# Patient Record
Sex: FEMALE | Race: WHITE | NOT HISPANIC OR LATINO
[De-identification: names, ages, dates, MRNs, and addresses within clinical notes are randomized per-mention and may not be internally consistent; named-entity substitution may affect disease eponyms.]

---

## 2019-11-22 PROBLEM — Z00.00 ENCOUNTER FOR PREVENTIVE HEALTH EXAMINATION: Status: ACTIVE | Noted: 2019-11-22

## 2019-11-25 ENCOUNTER — APPOINTMENT (OUTPATIENT)
Dept: UROGYNECOLOGY | Facility: CLINIC | Age: 59
End: 2019-11-25
Payer: COMMERCIAL

## 2019-11-25 VITALS
BODY MASS INDEX: 31.82 KG/M2 | SYSTOLIC BLOOD PRESSURE: 120 MMHG | WEIGHT: 198 LBS | HEIGHT: 66 IN | DIASTOLIC BLOOD PRESSURE: 70 MMHG

## 2019-11-25 LAB
BILIRUB UR QL STRIP: NORMAL
CLARITY UR: CLEAR
COLLECTION METHOD: NORMAL
GLUCOSE UR-MCNC: NORMAL
HCG UR QL: 0.2 EU/DL
HGB UR QL STRIP.AUTO: NORMAL
KETONES UR-MCNC: NORMAL
LEUKOCYTE ESTERASE UR QL STRIP: NORMAL
NITRITE UR QL STRIP: NORMAL
PH UR STRIP: 5.5
PROT UR STRIP-MCNC: NORMAL
SP GR UR STRIP: 1.01

## 2019-11-25 PROCEDURE — 81003 URINALYSIS AUTO W/O SCOPE: CPT | Mod: NC,QW

## 2019-11-25 PROCEDURE — 51701 INSERT BLADDER CATHETER: CPT

## 2019-11-25 PROCEDURE — 99244 OFF/OP CNSLTJ NEW/EST MOD 40: CPT | Mod: 25

## 2019-11-25 NOTE — HISTORY OF PRESENT ILLNESS
[FreeTextEntry1] : This is a 59-year-old woman with SLE and RA who describes having a baseline intermittent recurrent UTI every 3 months starting 2018 and  since January she essentially ishaving 'UTIs' which by her account are culture positive,treated with  antibiotics for about two thirds of each month. She hasvery brief periods of relief from symptoms which include dysuria and frequency. She has been on a number of regimens for RA and SLA  including steroids and a number of immunosuppressants. She is menopausal.\par \par Office asessment revealed normal external genitalia.There is significant atrophy of the apposing labia majora and labia minora (with irritation) and  there is moderate to severe atrophy of the vaginal introitus and urethral meatus. The vagina has normal depth and there's no evidence of any abnormal lesions. It is clear that she has rubbing and chafing sensation from the urethra and the labia she had immediate relief from the direct application of zinc oxide.\par \par The catheterized residual volume revealed 10 cc residual urine is negative for nitrites leukocytes and has trace microscopic blood. She is presently on nitrofurantoin.\par \par I do not have  her urine cultures by her account there almost always positive.\par \par Impression is that this patient has recurrent lower urinary tract symptomatology. Is unclear whether she has true UTI's  or whether she has false positives and atrophy symptoms which often are identical.  Based on her vaginal anatomy and labial anatomy voided urine specimens would have a very high false positive rate. I recommended only catheterized urine specimens and given her my partners office number closer to home to obtain cath urine specimens and she has symptoms. Given the remarkable improvement of symptoms of zinc oxide today I think she is voiding over inflamed atrophic tissues related to menopause and immunosuppressants.\par \par I have recommended vitamin C cranberry tablets hydration and methenamine twice daily as prevented its her UTIs. I would not recommend prophylactic antibiotics at this time. \par \par I Have prescribed a vaginal estrogen ring I think is from acid-base correction and the preventative actions of estrogen for UTIs and for vaginal comfort will give her great relief. The vaginal ring has a long least absorption of any of the local estrogens administer verify with Dr. Evelyn pope using this regimen\par \par I  recommended to stop using soap and scrubbing and excessive maneuvers to try clean this area and  have recommended a hand-held shower rinse only and the use of non-scented soaps (DOVE)\par \par She will using zinc or petroleum,  for symptomatic relief and petroleum if the white pasty zinc oxide is unfavorable for her.\par \par \par She'll see Dr. Franklin  in 2 weeks to establish herself the patient in the practice and to arrange so that she can access the practice with a doctor or nurse for catheterized specimens on short notice when necessary.  She should have no voided specimens.

## 2019-11-26 ENCOUNTER — RESULT REVIEW (OUTPATIENT)
Age: 59
End: 2019-11-26

## 2019-11-26 LAB
APPEARANCE: CLEAR
BACTERIA: NEGATIVE
BILIRUBIN URINE: NEGATIVE
BLOOD URINE: NEGATIVE
COLOR: NORMAL
GLUCOSE QUALITATIVE U: NEGATIVE
HYALINE CASTS: 0 /LPF
KETONES URINE: NEGATIVE
LEUKOCYTE ESTERASE URINE: NEGATIVE
MICROSCOPIC-UA: NORMAL
NITRITE URINE: NEGATIVE
PH URINE: 6
PROTEIN URINE: NEGATIVE
RED BLOOD CELLS URINE: 1 /HPF
SPECIFIC GRAVITY URINE: 1.01
SQUAMOUS EPITHELIAL CELLS: 0 /HPF
UROBILINOGEN URINE: NORMAL
WHITE BLOOD CELLS URINE: 0 /HPF

## 2019-11-27 ENCOUNTER — RESULT REVIEW (OUTPATIENT)
Age: 59
End: 2019-11-27

## 2019-11-27 LAB — BACTERIA UR CULT: NORMAL

## 2019-12-06 ENCOUNTER — APPOINTMENT (OUTPATIENT)
Dept: UROGYNECOLOGY | Facility: CLINIC | Age: 59
End: 2019-12-06
Payer: COMMERCIAL

## 2019-12-06 PROCEDURE — 99213 OFFICE O/P EST LOW 20 MIN: CPT

## 2019-12-06 NOTE — PHYSICAL EXAM
[No Acute Distress] : in no acute distress [Good Hygeine] : demonstrates good hygeine [Well developed] : well developed [Well Nourished] : ~L well nourished [Normal Memory] : ~T memory was ~L unimpaired [Soft, Nontender] : the abdomen was soft and nontender [Normal Mood/Affect] : mood and affect are normal [Oriented x3] : oriented to person, place, and time [Warm and Dry] : was warm and dry to touch [Normal Gait] : gait was normal [Vulvar Atrophy] : vulvar atrophy [Normal] : was normal [Atrophy] : atrophy

## 2019-12-09 NOTE — HISTORY OF PRESENT ILLNESS
[FreeTextEntry1] : Hx atrophic vaginitis and recurrent UTI.  Estring rx'd at last visit and pt presents today for insertion.  She has been using desitin daily with significant improvement in symptoms.  Denies any other changes since last visit.

## 2019-12-09 NOTE — DISCUSSION/SUMMARY
[FreeTextEntry1] : Estring inserted LOT [id number] exp 2/28[birth year]1921.  Pt has f/u in January with Dr. Green.  Multiple questions answered to her satisfaction and I spent 15 minutes with patient.  Instructed to call with any questions or concerns and she verbalizes understanding.\par

## 2020-01-23 NOTE — HISTORY OF PRESENT ILLNESS
[FreeTextEntry1] : Patient seen by Dr. Green within past few months, presenting to [facility] care. Reported hx of recurrent UTIs since 2018, do not have UCx reports avail to confirm. , atrophic vaginitis, estring placed in Dec 2019. Using zinc oxide to perineum, and was counseled on vit C, cranberry, and methenamine use. UCx specimens are to be cathed only.

## 2020-01-24 ENCOUNTER — APPOINTMENT (OUTPATIENT)
Dept: UROGYNECOLOGY | Facility: CLINIC | Age: 60
End: 2020-01-24

## 2020-01-24 ENCOUNTER — APPOINTMENT (OUTPATIENT)
Dept: UROGYNECOLOGY | Facility: CLINIC | Age: 60
End: 2020-01-24
Payer: COMMERCIAL

## 2020-01-24 DIAGNOSIS — N95.2 POSTMENOPAUSAL ATROPHIC VAGINITIS: ICD-10-CM

## 2020-01-24 PROCEDURE — 99214 OFFICE O/P EST MOD 30 MIN: CPT

## 2020-01-24 RX ORDER — METHENAMINE HIPPURATE 1 G/1
1 TABLET ORAL TWICE DAILY
Qty: 180 | Refills: 0 | Status: ACTIVE | COMMUNITY
Start: 2019-11-25 | End: 1900-01-01

## 2020-01-24 NOTE — HISTORY OF PRESENT ILLNESS
[FreeTextEntry1] : This is a patient it taken care of us previously for recurrent UTIs and atrophy who has had a vaginal estrogen ring in for several weeks. She has improvement in symptomatology. She does using externally, and if she does not use the zinc, and she sometimes has residual dysuria, symptoms, and it is her impression that most of her symptoms are related to atrophy.  She is thrilled with her improved symptoms.  \par \par She also came to us with undocumented multiple infections over the course of 6 months and is on preventative meds phentermine. Has also been without UTI symptoms.\par \par On examination, atrophy is improved, but it is clear. It is not getting the zinc placenta to the introitus as there is a clear demarcation at the point where there is no zinc and the more proximal region which is tender.  Education was given with regards to application of the cream. She'll continue with the vaginal estrogen ring and local zinc. I see her in March for the estrogen ring replacement and she remained symptom free. We will begin to taper methenamine\par \par Counseling was greater than 50 percent of encounter which included  26   minute face to face time for direct counseling.\par

## 2020-03-04 ENCOUNTER — APPOINTMENT (OUTPATIENT)
Dept: UROGYNECOLOGY | Facility: CLINIC | Age: 60
End: 2020-03-04

## 2020-03-13 ENCOUNTER — APPOINTMENT (OUTPATIENT)
Dept: UROGYNECOLOGY | Facility: CLINIC | Age: 60
End: 2020-03-13

## 2020-05-11 ENCOUNTER — APPOINTMENT (OUTPATIENT)
Dept: UROGYNECOLOGY | Facility: CLINIC | Age: 60
End: 2020-05-11
Payer: COMMERCIAL

## 2020-05-11 PROCEDURE — 99213 OFFICE O/P EST LOW 20 MIN: CPT | Mod: 25

## 2020-05-11 PROCEDURE — 51701 INSERT BLADDER CATHETER: CPT

## 2020-05-11 RX ORDER — CEPHALEXIN 500 MG/1
500 CAPSULE ORAL
Qty: 14 | Refills: 0 | Status: ACTIVE | COMMUNITY
Start: 2020-05-11 | End: 1900-01-01

## 2020-05-11 RX ORDER — NITROFURANTOIN (MONOHYDRATE/MACROCRYSTALS) 25; 75 MG/1; MG/1
100 CAPSULE ORAL
Qty: 10 | Refills: 0 | Status: DISCONTINUED | COMMUNITY
Start: 2020-04-13 | End: 2020-05-11

## 2020-05-11 NOTE — DISCUSSION/SUMMARY
[FreeTextEntry1] : Unable to perform in office dip due to recent pyridium use.  Will send CS and treat empirically with keflex.  Discussed that she does not have a culture confirmed UTI since starting estring and methenamine so it is likely helping her.  Instructed to call with any questions or concerns and she verbalizes understanding.\par

## 2020-05-11 NOTE — PROCEDURE
[Straight Catheterization] : insertion of a straight catheter [Patient] : the patient [Urinary Frequency] : urinary frequency [None] : there were no complications with the catheter insertion [___ Fr Straight Tip] : a [unfilled] in [place]amese straight tip catheter [Other: ___] : [unfilled] [Culture] : culture [No Complications] : no complications [Tolerated Well] : the patient tolerated the procedure well [Post procedure instructions and information given] : Post procedure instructions and information were given and reviewed with patient. [0] : 0

## 2020-05-11 NOTE — HISTORY OF PRESENT ILLNESS
[FreeTextEntry1] : Pt has hx recurrent UTI.  States she had about one per month last year until she was seen here.  She was started on estring 11/2019 but pt states "I don't think its working".  Last estring inserted 3/13/2020.  She was also started on methenamine which she is currently taking.  She called c/o dysuria and frequency on 4/13/20 and was given macrobid.  She did not want to come to office d/t concerns re: COVID-19.  Pt "thinks" symptoms improved after macrobid but returned 2 days ago.  Now she c/o frequency, dysuria and bladder spasms.  She took pyridium (last dose at 1pm) with some relief from dysuria.  Denies blood in the urine, back pain, fevers or chills.

## 2020-05-11 NOTE — PHYSICAL EXAM
[No Acute Distress] : in no acute distress [Well developed] : well developed [Well Nourished] : ~L well nourished [Good Hygeine] : demonstrates good hygeine [Oriented x3] : oriented to person, place, and time [Normal Mood/Affect] : mood and affect are normal [Normal Memory] : ~T memory was ~L unimpaired [Soft, Nontender] : the abdomen was soft and nontender [None] : no CVA tenderness [Normal Gait] : gait was normal [Warm and Dry] : was warm and dry to touch [Normal] : normal external genitalia

## 2020-05-15 ENCOUNTER — APPOINTMENT (OUTPATIENT)
Dept: OBGYN | Facility: CLINIC | Age: 60
End: 2020-05-15

## 2020-05-19 ENCOUNTER — TRANSCRIPTION ENCOUNTER (OUTPATIENT)
Age: 60
End: 2020-05-19

## 2020-05-19 ENCOUNTER — APPOINTMENT (OUTPATIENT)
Dept: UROGYNECOLOGY | Facility: CLINIC | Age: 60
End: 2020-05-19
Payer: COMMERCIAL

## 2020-05-19 DIAGNOSIS — Z87.440 PERSONAL HISTORY OF URINARY (TRACT) INFECTIONS: ICD-10-CM

## 2020-05-19 DIAGNOSIS — N95.2 POSTMENOPAUSAL ATROPHIC VAGINITIS: ICD-10-CM

## 2020-05-19 PROCEDURE — 99214 OFFICE O/P EST MOD 30 MIN: CPT | Mod: 95

## 2020-05-19 NOTE — HISTORY OF PRESENT ILLNESS
[Home] : at home, [unfilled] , at the time of the visit. [Patient] : the patient [Medical Office: ([facility])___] : at the medical office located in  [Self] : self [FreeTextEntry1] : 59 year old with frequency and complaints of recc uti however infection free since starting estring and methenamine.  Has negative culture May 11.\par \par Symptoms good today.  Started recent biologic actamra  for rheumatologic symptoms arthritis and lupus.   In the transition to this biologic felt symptoms, spasms, pain, frequency.  She received macrobid but cultures were negative.Similar to labor pain. \par \par She stopped the biologic and symptoms are approved.  \par \par It is my impression that she is having pelvic floor spasm likely and not bladder spasm as the dominant symptom os spasm and straining.  She will avoid bladder stimulants and start metaxalone for muscle relaxation.  I would consider vaginal diazepam as next step but suggest office exam when she feels an exacerbation is coming or is present.\par \par Renewal estring which she will change herself and rx for metaxalone\par \par \par Counseling was greater than 50 percent of encounter which included  29   minute face to face time for direct counseling.\par \par \par

## 2020-05-26 ENCOUNTER — APPOINTMENT (OUTPATIENT)
Dept: UROGYNECOLOGY | Facility: CLINIC | Age: 60
End: 2020-05-26
Payer: COMMERCIAL

## 2020-05-26 VITALS — TEMPERATURE: 98.9 F

## 2020-05-26 DIAGNOSIS — N95.2 POSTMENOPAUSAL ATROPHIC VAGINITIS: ICD-10-CM

## 2020-05-26 DIAGNOSIS — Z87.440 PERSONAL HISTORY OF URINARY (TRACT) INFECTIONS: ICD-10-CM

## 2020-05-26 PROCEDURE — 51701 INSERT BLADDER CATHETER: CPT

## 2020-05-26 PROCEDURE — 99214 OFFICE O/P EST MOD 30 MIN: CPT | Mod: 25

## 2020-05-26 RX ORDER — NITROFURANTOIN (MONOHYDRATE/MACROCRYSTALS) 25; 75 MG/1; MG/1
100 CAPSULE ORAL TWICE DAILY
Qty: 6 | Refills: 0 | Status: ACTIVE | COMMUNITY
Start: 2020-05-26 | End: 1900-01-01

## 2020-05-26 NOTE — HISTORY OF PRESENT ILLNESS
[FreeTextEntry1] : 59 year old with recc utis has had no infections since starting estring and methenamine.\par \par She has a telehealth visit in which pelvic spasm and burning were new symptoms and I felt likely was representing pelvic floor dysfunction. \par \par She has burrning and spasm for three days . Has been using cystex .\par \par On exam atrophy, and pelvic spasm are noted.  urine is dye stained so no office tests performed.  It was clear and non odorous\par \par Impression:\par \par PFD, atrophy, r/o uti\par \par Recommend:  off label counseling for valium vaginal suppository one half of ten mg suppository daily.  Suggest stopping trazadone at night and consider alternate sleep assist. Risks of multiple mood stabilizers reviewed.  Feel low dose absorption and severity of symptoms warrant use. \par \par Rx nitrofud bid 3 days and vaginal valium\par \par Counseling was greater than 50 percent of encounter which included  31  minute face to face time for direct counseling.\par

## 2020-06-23 ENCOUNTER — APPOINTMENT (OUTPATIENT)
Dept: UROGYNECOLOGY | Facility: CLINIC | Age: 60
End: 2020-06-23
Payer: COMMERCIAL

## 2020-06-23 DIAGNOSIS — M62.89 OTHER SPECIFIED DISORDERS OF MUSCLE: ICD-10-CM

## 2020-06-23 DIAGNOSIS — N95.2 POSTMENOPAUSAL ATROPHIC VAGINITIS: ICD-10-CM

## 2020-06-23 DIAGNOSIS — N30.90 CYSTITIS, UNSPECIFIED W/OUT HEMATURIA: ICD-10-CM

## 2020-06-23 DIAGNOSIS — Z87.440 PERSONAL HISTORY OF URINARY (TRACT) INFECTIONS: ICD-10-CM

## 2020-06-23 PROCEDURE — 99213 OFFICE O/P EST LOW 20 MIN: CPT | Mod: 95

## 2020-06-23 NOTE — HISTORY OF PRESENT ILLNESS
[Medical Office: ([facility])___] : at the medical office located in  [Home] : at home, [unfilled] , at the time of the visit. [Verbal consent obtained from patient] : the patient, [unfilled] [FreeTextEntry1] : 59 year old with Recurrent UTI's manages with methenamine and estring. \par \par She had recent symptoms and exam consistent with PFD and was started off label Valium vaginal suppositories and skelaxin\par \par She is on multiple mood stabilizers and recommended possible reduction in agents if acceptable with medical care team due to blader side effects. \par \par \par She is doing well.  Started new biologic med for rheumatoid arthritis and lupus.  She is only using skelaxin now as having nice result. Significant improvement. Started pelvic exercises after last appointment and feels also helping. I counseled regarding favoring pelvicrelaxation rather than strengthening.\par \par She will add heating pad to sacrum\par \par overall she is extremely happy with improvement and will continue regimen\par Counseling was greater than 50 percent of encounter which included  19   minute face to face time for direct counseling.\par

## 2021-05-03 RX ORDER — DIAZEPAM 100 %
POWDER (GRAM) MISCELLANEOUS
Qty: 30 | Refills: 0 | Status: ACTIVE | COMMUNITY
Start: 2020-05-26 | End: 1900-01-01

## 2021-06-15 ENCOUNTER — APPOINTMENT (OUTPATIENT)
Dept: UROGYNECOLOGY | Facility: CLINIC | Age: 61
End: 2021-06-15
Payer: COMMERCIAL

## 2021-06-15 VITALS
TEMPERATURE: 98.3 F | SYSTOLIC BLOOD PRESSURE: 133 MMHG | WEIGHT: 210 LBS | DIASTOLIC BLOOD PRESSURE: 80 MMHG | BODY MASS INDEX: 33.75 KG/M2 | HEIGHT: 66 IN | HEART RATE: 91 BPM

## 2021-06-15 LAB
BILIRUB UR QL STRIP: NORMAL
CLARITY UR: NORMAL
COLLECTION METHOD: NORMAL
GLUCOSE UR-MCNC: NORMAL
HCG UR QL: 0.2 EU/DL
HGB UR QL STRIP.AUTO: NORMAL
KETONES UR-MCNC: NORMAL
LEUKOCYTE ESTERASE UR QL STRIP: NORMAL
NITRITE UR QL STRIP: NORMAL
PH UR STRIP: 5.5
PROT UR STRIP-MCNC: 300
SP GR UR STRIP: 1.03

## 2021-06-15 PROCEDURE — 99214 OFFICE O/P EST MOD 30 MIN: CPT | Mod: 25

## 2021-06-15 PROCEDURE — 51701 INSERT BLADDER CATHETER: CPT

## 2021-06-15 PROCEDURE — 99072 ADDL SUPL MATRL&STAF TM PHE: CPT

## 2021-06-15 RX ORDER — METHENAMINE HIPPURATE 1 G/1
1 TABLET ORAL TWICE DAILY
Qty: 180 | Refills: 0 | Status: ACTIVE | COMMUNITY
Start: 2021-06-15 | End: 1900-01-01

## 2021-06-15 NOTE — HISTORY OF PRESENT ILLNESS
[FreeTextEntry1] : Testing chest this is a 60-year-old woman with pelvic floor dysfunction nor a significant muscle spasm and both pain and lower urinary tract symptomatology. He is on multiple mood stabilizing medications and we requiring if any to be reduced. Some of them have bladder side effects.\par \par The previous telescope visit. She was on Skelaxin only having stopped. The vaginal diazepam suppositories and she was doing quite well. She is also going to physical therapy for myofascial release a of june 2020 which was her last evaluation.\par \par She was not in regular followup with us because due to her immunosuppression . She was not going out during the pandemic.\par 2 history once for UTI and once had UTI symptoms, but the culture was negative.   Frequency, urgency, and dysuria or increased q. day, and this has provoked an increase in vaginal spasm\par \par On exam the vulva has significant atrophy. There is significant distance from the external vulva to the urethra, which is quite proximal.  It is clear that she is soiling of the vulvar tissues extensively during voiding..\par \par \par Due to symptoms of hesitancy and dysuria. Catheterization was performed. 10 cc residual volume any odorous cloudy and consistent with cystitis, which was confirmed on urine dip.  A culture was sent..  \par \par I will prescribe nitrofurantoin twice a day for 5 days and she will resume mass and a mean one tablet daily. She was using successfully for UTI suppression Prior to 2020\par \par Counseling was greater than 50 percent of encounter which included  26   minute face to face time for direct counseling.\par \par A chaperone was present for the entirety of the physical examination and for the exam room portion of patient interview\par

## 2021-06-17 LAB — BACTERIA UR CULT: NORMAL

## 2021-06-21 ENCOUNTER — NON-APPOINTMENT (OUTPATIENT)
Age: 61
End: 2021-06-21

## 2021-06-22 ENCOUNTER — NON-APPOINTMENT (OUTPATIENT)
Age: 61
End: 2021-06-22

## 2021-06-23 ENCOUNTER — APPOINTMENT (OUTPATIENT)
Dept: UROGYNECOLOGY | Facility: CLINIC | Age: 61
End: 2021-06-23
Payer: COMMERCIAL

## 2021-06-23 VITALS — TEMPERATURE: 97.6 F | DIASTOLIC BLOOD PRESSURE: 84 MMHG | SYSTOLIC BLOOD PRESSURE: 162 MMHG

## 2021-06-23 LAB
BILIRUB UR QL STRIP: NEGATIVE
CLARITY UR: NORMAL
COLLECTION METHOD: NORMAL
GLUCOSE UR-MCNC: NEGATIVE
HCG UR QL: 0.2 EU/DL
HGB UR QL STRIP.AUTO: NORMAL
KETONES UR-MCNC: NEGATIVE
LEUKOCYTE ESTERASE UR QL STRIP: NORMAL
NITRITE UR QL STRIP: NEGATIVE
PH UR STRIP: 5.5
PROT UR STRIP-MCNC: NEGATIVE
SP GR UR STRIP: >= 1.03

## 2021-06-23 PROCEDURE — 99072 ADDL SUPL MATRL&STAF TM PHE: CPT

## 2021-06-23 PROCEDURE — 99214 OFFICE O/P EST MOD 30 MIN: CPT

## 2021-06-23 PROCEDURE — 81003 URINALYSIS AUTO W/O SCOPE: CPT | Mod: QW

## 2021-06-23 RX ORDER — NITROFURANTOIN MACROCRYSTALS 100 MG/1
100 CAPSULE ORAL
Qty: 10 | Refills: 0 | Status: ACTIVE | COMMUNITY
Start: 2021-06-15 | End: 1900-01-01

## 2021-06-23 NOTE — PHYSICAL EXAM
[No Acute Distress] : in no acute distress [Well developed] : well developed [Well Nourished] : ~L well nourished [Good Hygeine] : demonstrates good hygeine [Oriented x3] : oriented to person, place, and time [Normal Mood/Affect] : mood and affect are normal [Anxiety] : patient is anxious [Warm and Dry] : was warm and dry to touch [Normal Gait] : gait was normal [Labia Majora] : were normal [Labia Minora] : were normal [Normal] : was normal

## 2021-06-23 NOTE — REASON FOR VISIT
[Follow-Up Visit_____] : a follow-up visit for [unfilled] [Painful Urination] : painful urination [________] : [unfilled]

## 2021-06-23 NOTE — DISCUSSION/SUMMARY
[FreeTextEntry1] : #Estring:\par -Both estrings removed, reinserted one of the two.  The patient knows it is impossible to know if the newest one was reinserted.  She understands that the old one may be in situ.  \par \par #Suspected UTI:\par -Udip: trace blood, small leukocytes\par -F/U formal UA and UCx\par -Sent another 5 day course of Macrobid to the pharmacy\par -Patient in on immunosuppression, may want to consider longer course of treatment for future UTI infections\par \par #Dispo:\par -Patient is scheduled to RTO on 7/22\par \par All questions answered to the patient's satisfaction.  Patient expresses understanding.

## 2021-06-23 NOTE — PROCEDURE
[Straight Catheterization] : insertion of a straight catheter [Urinary Tract Infection] : a urinary tract infection [Patient] : the patient [Allergies Reviewed] : Allergies reviewed [___ Fr Straight Tip] : a [unfilled] in [place]ese straight tip catheter [None] : there were no complications with the catheter insertion [Cloudy] : cloudy [Culture] : culture [Urinalysis] : urinalysis [No Complications] : no complications [Tolerated Well] : the patient tolerated the procedure well [0] : 0 [Follow Up] : follow up [FreeTextEntry2] : Both Estrings removed, replaced with what is believe to be the newest one

## 2021-06-23 NOTE — REVIEW OF SYSTEMS
[All Other ROS] : all other reviewed systems are negative [FreeTextEntry1] : dysuria, urinary frequency

## 2021-06-25 ENCOUNTER — NON-APPOINTMENT (OUTPATIENT)
Age: 61
End: 2021-06-25

## 2021-06-25 LAB — BACTERIA UR CULT: NORMAL

## 2021-06-28 LAB
APPEARANCE: CLEAR
BACTERIA: NEGATIVE
BILIRUBIN URINE: NEGATIVE
BLOOD URINE: NORMAL
COLOR: YELLOW
GLUCOSE QUALITATIVE U: NEGATIVE
HYALINE CASTS: 2 /LPF
KETONES URINE: NEGATIVE
LEUKOCYTE ESTERASE URINE: ABNORMAL
MICROSCOPIC-UA: NORMAL
NITRITE URINE: NEGATIVE
PH URINE: 6
PROTEIN URINE: NORMAL
RED BLOOD CELLS URINE: 4 /HPF
SPECIFIC GRAVITY URINE: 1.03
SQUAMOUS EPITHELIAL CELLS: 6 /HPF
UROBILINOGEN URINE: NORMAL
WHITE BLOOD CELLS URINE: 89 /HPF

## 2021-07-22 ENCOUNTER — APPOINTMENT (OUTPATIENT)
Dept: UROGYNECOLOGY | Facility: CLINIC | Age: 61
End: 2021-07-22
Payer: COMMERCIAL

## 2021-07-22 ENCOUNTER — NON-APPOINTMENT (OUTPATIENT)
Age: 61
End: 2021-07-22

## 2021-07-22 VITALS — TEMPERATURE: 96.9 F | SYSTOLIC BLOOD PRESSURE: 152 MMHG | DIASTOLIC BLOOD PRESSURE: 82 MMHG

## 2021-07-22 DIAGNOSIS — M79.18 MYALGIA, OTHER SITE: ICD-10-CM

## 2021-07-22 PROCEDURE — 99072 ADDL SUPL MATRL&STAF TM PHE: CPT

## 2021-07-22 PROCEDURE — 99213 OFFICE O/P EST LOW 20 MIN: CPT

## 2021-07-22 RX ORDER — ESTRADIOL 2 MG/1
2 RING VAGINAL
Qty: 1 | Refills: 3 | Status: ACTIVE | COMMUNITY
Start: 2019-11-25 | End: 1900-01-01

## 2021-07-22 NOTE — HISTORY OF PRESENT ILLNESS
[FreeTextEntry1] : This is a 60-year-old woman with frequency, urgency, and pelvic floor dysfunction. He uses Estring for hormone replacement vaginal diazepam every other day nitrofurantoin prophylaxis. She's had 2 recent cultures for increasing lower urinary tract symptomatology, and they were both negative. She's R. rheumatologist thought this might be a flap from recurrent arthritis or lupus instigating any interstitial cystitis type response and she had excellent response to gabapentin 300 mg once daily. She continues to take metaxalone half a tablet each morning, and methenamine prophylaxis\par \par \par On this regimen. Her symptoms are quite nicely and check it is of interest. The gabapentin had significant improvement and off flare suggesting a connection between her rheumatoid and inflammatory state and her bladder, inflammatory state.   She's a renewal on the vaginal ring adjacent to pharmacy, otherwise, she'll continue the regimen as listed above.\par \par She still has a best much for often that she would like. She is number of side effects from other medications that prohibit anticholinergics.   At the next, visit she's able to taper down some of these other medications. We will consider myrbetriq history she is able to have  blood pressure checks weekly for a month or so \par \par Counseling was greater than 50 percent of encounter which included  21   minute face to face time for direct counseling.\par \par A chaperone was present for the entirety of the physical examination and for the exam room portion of patient interview\par

## 2021-07-29 ENCOUNTER — NON-APPOINTMENT (OUTPATIENT)
Age: 61
End: 2021-07-29

## 2021-09-23 ENCOUNTER — NON-APPOINTMENT (OUTPATIENT)
Age: 61
End: 2021-09-23

## 2021-09-23 ENCOUNTER — APPOINTMENT (OUTPATIENT)
Dept: UROGYNECOLOGY | Facility: CLINIC | Age: 61
End: 2021-09-23
Payer: COMMERCIAL

## 2021-09-23 VITALS — SYSTOLIC BLOOD PRESSURE: 150 MMHG | DIASTOLIC BLOOD PRESSURE: 80 MMHG | TEMPERATURE: 97.2 F

## 2021-09-23 DIAGNOSIS — R39.11 HESITANCY OF MICTURITION: ICD-10-CM

## 2021-09-23 DIAGNOSIS — N32.81 OVERACTIVE BLADDER: ICD-10-CM

## 2021-09-23 DIAGNOSIS — M62.838 OTHER MUSCLE SPASM: ICD-10-CM

## 2021-09-23 LAB
BILIRUB UR QL STRIP: NORMAL
CLARITY UR: NORMAL
COLLECTION METHOD: NORMAL
GLUCOSE UR-MCNC: NORMAL
HCG UR QL: 0.2 EU/DL
HGB UR QL STRIP.AUTO: NORMAL
KETONES UR-MCNC: NORMAL
LEUKOCYTE ESTERASE UR QL STRIP: NORMAL
NITRITE UR QL STRIP: NORMAL
PH UR STRIP: 6
PROT UR STRIP-MCNC: NORMAL
SP GR UR STRIP: 1.02

## 2021-09-23 PROCEDURE — 51701 INSERT BLADDER CATHETER: CPT

## 2021-09-23 PROCEDURE — 99213 OFFICE O/P EST LOW 20 MIN: CPT | Mod: 25

## 2021-09-23 RX ORDER — MIRABEGRON 50 MG/1
50 TABLET, FILM COATED, EXTENDED RELEASE ORAL
Qty: 90 | Refills: 1 | Status: ACTIVE | COMMUNITY
Start: 2021-09-23 | End: 1900-01-01

## 2021-09-23 RX ORDER — NITROFURANTOIN MACROCRYSTALS 100 MG/1
100 CAPSULE ORAL
Qty: 10 | Refills: 0 | Status: ACTIVE | COMMUNITY
Start: 2021-09-23 | End: 1900-01-01

## 2021-09-23 NOTE — HISTORY OF PRESENT ILLNESS
[FreeTextEntry1] : This is a  60-year-old woman whi  is treated with the Estring vaginal ring for atrophy. Cultures in the May and  June, time-frame are negative. She occasionally has flares offrequency and urgency, which had been high recently, but in the last 2 days during particular relaxation exercises. Her frequency and urgency was improved.\par \par A third, probable pelvic floor spasms pinching and burning in the vaginal region largely improved and almost resolved with metaxalone.\par \par \par The frequency and urgency remainder most significant symptoms. The examination is unchanged. Very atrophic changes somewhat improved from previous. She was catheterized to rule infection and did indeed have trace blood large leukocytes. She is immunosuppressed she may have expressing the usual UTI symptoms, but with increased frequency of uterine nitrofurantoin twice a day for 5 days.  \par \par The Estring was removed and administering replaced, which will continue every 3 months. Risks, benefits, and adverse reactions have been reviewed\par \par \par She does not have high blood pressure, and we discussed possible interactions other medications. She is taking and the need for blood pressure monitoring and after counseling of her some benefit she would like to start  Myrbetriq 50 mg daily.    She understands guidelines for blood pressure testing, and when to stop the medication. I will follow up in 3 months, unless her symptoms do not improve or if they are exacerbated\par \par New Prescription: Myrbetriq\par \par New Prescription for active UTI:  Nitrofurantoin 100 mg bid for 5 days\par \par Counseling was greater than 50 percent of encounter which included  33   minute face to face time for direct counseling.\par \par A chaperone was present for the entirety of the physical examination and for the exam room portion of patient interview\par \par \par \par

## 2021-09-27 ENCOUNTER — NON-APPOINTMENT (OUTPATIENT)
Age: 61
End: 2021-09-27

## 2021-09-27 LAB
APPEARANCE: ABNORMAL
BACTERIA: ABNORMAL
BILIRUBIN URINE: NEGATIVE
BLOOD URINE: NORMAL
COLOR: YELLOW
GLUCOSE QUALITATIVE U: NEGATIVE
HYALINE CASTS: 1 /LPF
KETONES URINE: NEGATIVE
LEUKOCYTE ESTERASE URINE: ABNORMAL
MICROSCOPIC-UA: NORMAL
NITRITE URINE: POSITIVE
PH URINE: 6
PROTEIN URINE: NORMAL
RED BLOOD CELLS URINE: 6 /HPF
SPECIFIC GRAVITY URINE: 1.02
SQUAMOUS EPITHELIAL CELLS: 2 /HPF
UROBILINOGEN URINE: NORMAL
WHITE BLOOD CELLS URINE: 419 /HPF

## 2021-12-07 ENCOUNTER — APPOINTMENT (OUTPATIENT)
Dept: UROGYNECOLOGY | Facility: CLINIC | Age: 61
End: 2021-12-07
Payer: COMMERCIAL

## 2021-12-07 VITALS — TEMPERATURE: 97.3 F

## 2021-12-07 DIAGNOSIS — N39.0 URINARY TRACT INFECTION, SITE NOT SPECIFIED: ICD-10-CM

## 2021-12-07 DIAGNOSIS — R30.0 DYSURIA: ICD-10-CM

## 2021-12-07 DIAGNOSIS — Z87.898 PERSONAL HISTORY OF OTHER SPECIFIED CONDITIONS: ICD-10-CM

## 2021-12-07 DIAGNOSIS — R39.15 URGENCY OF URINATION: ICD-10-CM

## 2021-12-07 DIAGNOSIS — R35.0 FREQUENCY OF MICTURITION: ICD-10-CM

## 2021-12-07 PROCEDURE — 99213 OFFICE O/P EST LOW 20 MIN: CPT

## 2021-12-07 RX ORDER — PHENAZOPYRIDINE 200 MG/1
200 TABLET, FILM COATED ORAL 3 TIMES DAILY
Qty: 15 | Refills: 0 | Status: ACTIVE | COMMUNITY
Start: 2021-06-30 | End: 1900-01-01

## 2021-12-07 NOTE — HISTORY OF PRESENT ILLNESS
[FreeTextEntry1] : patient with hx of TAYLER and chronic IC presents to office with complaints of urinary urgency, frequency and dysuria x 4 days. She states on Saturday she did a telehealth visit and was started on Macrobid x 5 days. States she has not had her urine tested, and she has been taking the macrobid as RX with no relief. Denies fever, chills, back pain or blood in urine. States she took Pyridium with minimal relief. She currently has an estring in place.

## 2021-12-07 NOTE — DISCUSSION/SUMMARY
[FreeTextEntry1] : #Suspected UTI:\par - Unable to dip urine in office due to taking Pyridium, urine is very orange in color. Formal Urine CS sent to lab. \par - Complete course of Macrobid as RX\par - Advised to take Pyridium 200mg 1 tab q8h as needed, educated to take with food\par - Patient in on immunosuppression, may want to consider longer course of treatment for future UTI infections\par - Educated patient to follow IC diet\par \par Will RTO in 1 month for estring or sooner if needed.  All questions answered to the patient's satisfaction.  Patient expresses understanding.

## 2021-12-07 NOTE — PROCEDURE
[Straight Catheterization] : insertion of a straight catheter [Urinary Tract Infection] : a urinary tract infection [Patient] : the patient [Allergies Reviewed] : Allergies reviewed [___ Fr Straight Tip] : a [unfilled] in [place] straight tip catheter [None] : there were no complications with the catheter insertion [Culture] : culture [No Complications] : no complications [Tolerated Well] : the patient tolerated the procedure well [0] : 0 [Other: ___] : [unfilled] [FreeTextEntry1] : Urethra cleared, catheter passed. No CVAT

## 2021-12-07 NOTE — PHYSICAL EXAM
[No Acute Distress] : in no acute distress [Well developed] : well developed [Well Nourished] : ~L well nourished [Good Hygeine] : demonstrates good hygeine [Oriented x3] : oriented to person, place, and time [Normal Mood/Affect] : mood and affect are normal [Anxiety] : patient is anxious [Warm and Dry] : was warm and dry to touch [Normal Gait] : gait was normal [Labia Majora] : were normal [Labia Minora] : were normal [Normal] : was normal [None] : no CVA tenderness [Normal Memory] : ~T memory was ~L impaired [Tenderness] : ~T no ~M abdominal tenderness observed [Distended] : not distended

## 2021-12-07 NOTE — REVIEW OF SYSTEMS
[All Other ROS] : all other reviewed systems are negative [FreeTextEntry1] : Dyusira, urinary frequency, urinary urgency

## 2021-12-08 RX ORDER — TOCILIZUMAB 180 MG/ML
162 INJECTION, SOLUTION SUBCUTANEOUS
Refills: 0 | Status: ACTIVE | COMMUNITY

## 2021-12-23 ENCOUNTER — APPOINTMENT (OUTPATIENT)
Dept: UROGYNECOLOGY | Facility: CLINIC | Age: 61
End: 2021-12-23

## 2021-12-23 RX ORDER — METAXALONE 800 MG/1
800 TABLET ORAL
Qty: 90 | Refills: 0 | Status: ACTIVE | COMMUNITY
Start: 2020-05-19 | End: 1900-01-01

## 2022-01-06 ENCOUNTER — APPOINTMENT (OUTPATIENT)
Dept: UROGYNECOLOGY | Facility: CLINIC | Age: 62
End: 2022-01-06

## 2022-12-09 ENCOUNTER — APPOINTMENT (OUTPATIENT)
Dept: FAMILY MEDICINE | Facility: CLINIC | Age: 62
End: 2022-12-09

## 2022-12-09 VITALS
HEART RATE: 88 BPM | SYSTOLIC BLOOD PRESSURE: 136 MMHG | DIASTOLIC BLOOD PRESSURE: 78 MMHG | OXYGEN SATURATION: 98 % | TEMPERATURE: 99 F | HEIGHT: 66 IN

## 2022-12-09 DIAGNOSIS — M32.9 SYSTEMIC LUPUS ERYTHEMATOSUS, UNSPECIFIED: ICD-10-CM

## 2022-12-09 DIAGNOSIS — Z78.9 OTHER SPECIFIED HEALTH STATUS: ICD-10-CM

## 2022-12-09 DIAGNOSIS — F17.200 NICOTINE DEPENDENCE, UNSPECIFIED, UNCOMPLICATED: ICD-10-CM

## 2022-12-09 DIAGNOSIS — Z80.3 FAMILY HISTORY OF MALIGNANT NEOPLASM OF BREAST: ICD-10-CM

## 2022-12-09 DIAGNOSIS — Z82.49 FAMILY HISTORY OF ISCHEMIC HEART DISEASE AND OTHER DISEASES OF THE CIRCULATORY SYSTEM: ICD-10-CM

## 2022-12-09 DIAGNOSIS — Z87.39 PERSONAL HISTORY OF OTHER DISEASES OF THE MUSCULOSKELETAL SYSTEM AND CONNECTIVE TISSUE: ICD-10-CM

## 2022-12-09 DIAGNOSIS — N30.10 INTERSTITIAL CYSTITIS (CHRONIC) W/OUT HEMATURIA: ICD-10-CM

## 2022-12-09 PROCEDURE — 99204 OFFICE O/P NEW MOD 45 MIN: CPT

## 2022-12-09 RX ORDER — IBUPROFEN 800 MG/1
800 TABLET, FILM COATED ORAL
Qty: 270 | Refills: 0 | Status: ACTIVE | COMMUNITY
Start: 2022-10-31

## 2022-12-09 RX ORDER — CIPROFLOXACIN HYDROCHLORIDE 500 MG/1
500 TABLET, FILM COATED ORAL
Qty: 20 | Refills: 0 | Status: DISCONTINUED | COMMUNITY
Start: 2022-11-03

## 2022-12-09 RX ORDER — AZITHROMYCIN 250 MG/1
250 TABLET, FILM COATED ORAL
Qty: 6 | Refills: 0 | Status: DISCONTINUED | COMMUNITY
Start: 2022-07-07

## 2022-12-09 RX ORDER — LEFLUNOMIDE 20 MG/1
20 TABLET, FILM COATED ORAL
Qty: 90 | Refills: 0 | Status: DISCONTINUED | COMMUNITY
Start: 2022-06-15

## 2022-12-09 RX ORDER — GABAPENTIN 300 MG/1
300 CAPSULE ORAL
Qty: 120 | Refills: 0 | Status: ACTIVE | COMMUNITY
Start: 2022-11-07

## 2022-12-09 RX ORDER — MIRABEGRON 50 MG/1
50 TABLET, FILM COATED, EXTENDED RELEASE ORAL
Qty: 90 | Refills: 0 | Status: ACTIVE | COMMUNITY
Start: 2021-09-23

## 2022-12-09 RX ORDER — MIRABEGRON 25 MG/1
25 TABLET, FILM COATED, EXTENDED RELEASE ORAL
Qty: 90 | Refills: 0 | Status: DISCONTINUED | COMMUNITY
Start: 2022-07-18

## 2022-12-09 RX ORDER — ATORVASTATIN CALCIUM 20 MG/1
20 TABLET, FILM COATED ORAL
Qty: 90 | Refills: 0 | Status: DISCONTINUED | COMMUNITY
Start: 2022-06-03

## 2022-12-09 RX ORDER — PHENAZOPYRIDINE HYDROCHLORIDE 200 MG/1
200 TABLET ORAL
Qty: 6 | Refills: 0 | Status: DISCONTINUED | COMMUNITY
Start: 2022-11-03

## 2022-12-09 RX ORDER — NITROFURANTOIN (MONOHYDRATE/MACROCRYSTALS) 25; 75 MG/1; MG/1
100 CAPSULE ORAL
Qty: 14 | Refills: 0 | Status: DISCONTINUED | COMMUNITY
Start: 2022-11-07

## 2022-12-09 RX ORDER — AMOXICILLIN AND CLAVULANATE POTASSIUM 875; 125 MG/1; MG/1
875-125 TABLET, COATED ORAL
Qty: 14 | Refills: 0 | Status: DISCONTINUED | COMMUNITY
Start: 2022-10-03

## 2022-12-09 RX ORDER — UPADACITINIB 45 MG/1
TABLET, EXTENDED RELEASE ORAL
Refills: 0 | Status: ACTIVE | COMMUNITY

## 2022-12-09 RX ORDER — HYDROXYZINE HYDROCHLORIDE 25 MG/1
25 TABLET ORAL
Qty: 30 | Refills: 0 | Status: ACTIVE | COMMUNITY
Start: 2022-12-01

## 2022-12-09 RX ORDER — ESTRADIOL 0.1 MG/G
0.1 CREAM VAGINAL
Refills: 0 | Status: ACTIVE | COMMUNITY

## 2022-12-09 RX ORDER — TRAZODONE HYDROCHLORIDE 50 MG/1
50 TABLET ORAL
Qty: 90 | Refills: 0 | Status: ACTIVE | COMMUNITY
Start: 2022-09-23

## 2022-12-09 RX ORDER — DULOXETINE HYDROCHLORIDE 30 MG/1
30 CAPSULE, DELAYED RELEASE PELLETS ORAL
Qty: 270 | Refills: 0 | Status: ACTIVE | COMMUNITY
Start: 2022-09-23

## 2022-12-09 RX ORDER — LEFLUNOMIDE 10 MG/1
10 TABLET, FILM COATED ORAL
Qty: 90 | Refills: 0 | Status: DISCONTINUED | COMMUNITY
Start: 2022-09-16

## 2022-12-09 RX ORDER — METHENAMINE HIPPURATE 1 G/1
1 TABLET ORAL
Qty: 60 | Refills: 0 | Status: DISCONTINUED | COMMUNITY
Start: 2022-10-28

## 2022-12-09 RX ORDER — DOXYCYCLINE HYCLATE 100 MG/1
100 CAPSULE ORAL
Qty: 20 | Refills: 0 | Status: DISCONTINUED | COMMUNITY
Start: 2022-09-04

## 2022-12-09 RX ORDER — PREDNISONE 5 MG/1
5 TABLET ORAL
Qty: 60 | Refills: 0 | Status: DISCONTINUED | COMMUNITY
Start: 2022-09-23

## 2022-12-09 NOTE — HISTORY OF PRESENT ILLNESS
[Cold Symptoms] : cold symptoms [Severe] : severe [___ Days ago] : [unfilled] days ago [Constant] : constant [Congestion] : congestion [Cough] : cough [Sore Throat] : sore throat [Wheezing] : wheezing [Chills] : chills [Anorexia] : no anorexia [Shortness Of Breath] : no shortness of breath [Earache] : no earache [Fatigue] : fatigue [Headache] : headache [Fever] : fever [Worsening] : worsening

## 2022-12-26 ENCOUNTER — TRANSCRIPTION ENCOUNTER (OUTPATIENT)
Age: 62
End: 2022-12-26

## 2023-04-11 ENCOUNTER — APPOINTMENT (OUTPATIENT)
Dept: FAMILY MEDICINE | Facility: CLINIC | Age: 63
End: 2023-04-11
Payer: COMMERCIAL

## 2023-04-11 VITALS
SYSTOLIC BLOOD PRESSURE: 122 MMHG | OXYGEN SATURATION: 98 % | DIASTOLIC BLOOD PRESSURE: 80 MMHG | WEIGHT: 194 LBS | TEMPERATURE: 99.5 F | HEIGHT: 66 IN | BODY MASS INDEX: 31.18 KG/M2 | HEART RATE: 108 BPM

## 2023-04-11 DIAGNOSIS — Z13.220 ENCOUNTER FOR SCREENING FOR LIPOID DISORDERS: ICD-10-CM

## 2023-04-11 DIAGNOSIS — Z13.1 ENCOUNTER FOR SCREENING FOR DIABETES MELLITUS: ICD-10-CM

## 2023-04-11 DIAGNOSIS — Z00.00 ENCOUNTER FOR GENERAL ADULT MEDICAL EXAMINATION W/OUT ABNORMAL FINDINGS: ICD-10-CM

## 2023-04-11 DIAGNOSIS — J32.9 CHRONIC SINUSITIS, UNSPECIFIED: ICD-10-CM

## 2023-04-11 DIAGNOSIS — Z13.29 ENCOUNTER FOR SCREENING FOR OTHER SUSPECTED ENDOCRINE DISORDER: ICD-10-CM

## 2023-04-11 PROCEDURE — 99214 OFFICE O/P EST MOD 30 MIN: CPT

## 2023-04-11 RX ORDER — AMOXICILLIN AND CLAVULANATE POTASSIUM 875; 125 MG/1; MG/1
875-125 TABLET, COATED ORAL
Qty: 20 | Refills: 0 | Status: ACTIVE | COMMUNITY
Start: 2022-12-09 | End: 1900-01-01

## 2023-04-11 NOTE — REVIEW OF SYSTEMS
[Fever] : fever [Fatigue] : fatigue [Nasal Discharge] : nasal discharge [Postnasal Drip] : postnasal drip [Cough] : cough [Negative] : Heme/Lymph [Earache] : no earache

## 2023-04-11 NOTE — PHYSICAL EXAM
[No Acute Distress] : no acute distress [Well Nourished] : well nourished [Well Developed] : well developed [Well-Appearing] : well-appearing [Normal Sclera/Conjunctiva] : normal sclera/conjunctiva [PERRL] : pupils equal round and reactive to light [EOMI] : extraocular movements intact [Normal Outer Ear/Nose] : the outer ears and nose were normal in appearance [No JVD] : no jugular venous distention [No Lymphadenopathy] : no lymphadenopathy [Supple] : supple [No Respiratory Distress] : no respiratory distress  [No Accessory Muscle Use] : no accessory muscle use [Clear to Auscultation] : lungs were clear to auscultation bilaterally [Normal Rate] : normal rate  [Regular Rhythm] : with a regular rhythm [Normal S1, S2] : normal S1 and S2 [No Murmur] : no murmur heard [Pedal Pulses Present] : the pedal pulses are present [No Edema] : there was no peripheral edema [No Palpable Aorta] : no palpable aorta [No Extremity Clubbing/Cyanosis] : no extremity clubbing/cyanosis [Soft] : abdomen soft [Non Tender] : non-tender [Non-distended] : non-distended [Normal Posterior Cervical Nodes] : no posterior cervical lymphadenopathy [Normal Anterior Cervical Nodes] : no anterior cervical lymphadenopathy [No CVA Tenderness] : no CVA  tenderness [No Spinal Tenderness] : no spinal tenderness [No Joint Swelling] : no joint swelling [Grossly Normal Strength/Tone] : grossly normal strength/tone [No Rash] : no rash [Coordination Grossly Intact] : coordination grossly intact [No Focal Deficits] : no focal deficits [Normal Gait] : normal gait [Speech Grossly Normal] : speech grossly normal [Normal Affect] : the affect was normal [Alert and Oriented x3] : oriented to person, place, and time [Normal Mood] : the mood was normal [Normal Insight/Judgement] : insight and judgment were intact [de-identified] : erythema of post pharynx with cobblestoning, no exudate, erythema  of nasal mucosa, TM mild post ear effusion, no erythema of tm

## 2023-04-11 NOTE — HISTORY OF PRESENT ILLNESS
[FreeTextEntry8] : Patient here complaining of feeling feverish, cough, and  achy. \par states 5 days of cough tactile elevated temp , and now with sinus pressure and congestion . no sore throat , no ear pain \par no chest pain, no sob, no dizziness, no abdominal pain, no n/v/d/c/melena/brbpr/hematuria/dysuria\par

## 2024-03-10 PROBLEM — N30.10 INTERSTITIAL CYSTITIS: Status: RESOLVED | Noted: 2022-12-09 | Resolved: 2024-03-10
